# Patient Record
Sex: FEMALE | Race: BLACK OR AFRICAN AMERICAN | NOT HISPANIC OR LATINO | URBAN - METROPOLITAN AREA
[De-identification: names, ages, dates, MRNs, and addresses within clinical notes are randomized per-mention and may not be internally consistent; named-entity substitution may affect disease eponyms.]

---

## 2022-08-31 ENCOUNTER — OFFICE VISIT (OUTPATIENT)
Dept: URBAN - METROPOLITAN AREA CLINIC 44 | Facility: CLINIC | Age: 42
End: 2022-08-31

## 2022-10-26 ENCOUNTER — OFFICE VISIT (OUTPATIENT)
Dept: URBAN - METROPOLITAN AREA CLINIC 44 | Facility: CLINIC | Age: 42
End: 2022-10-26

## 2022-12-29 ENCOUNTER — OFFICE VISIT (OUTPATIENT)
Dept: URBAN - METROPOLITAN AREA CLINIC 46 | Facility: CLINIC | Age: 42
End: 2022-12-29
Payer: COMMERCIAL

## 2022-12-29 VITALS
HEART RATE: 76 BPM | WEIGHT: 253.4 LBS | TEMPERATURE: 98.3 F | DIASTOLIC BLOOD PRESSURE: 85 MMHG | SYSTOLIC BLOOD PRESSURE: 131 MMHG

## 2022-12-29 DIAGNOSIS — R10.13 DYSPEPSIA: ICD-10-CM

## 2022-12-29 PROCEDURE — 99204 OFFICE O/P NEW MOD 45 MIN: CPT | Performed by: INTERNAL MEDICINE

## 2022-12-29 RX ORDER — ERGOCALCIFEROL 1.25 MG/1
TAKE 1 CAPSULE BY MOUTH ONE TIME PER WEEK CAPSULE, LIQUID FILLED ORAL
Qty: 12 EACH | Refills: 1 | Status: ACTIVE | COMMUNITY

## 2022-12-29 RX ORDER — FLUTICASONE PROPIONATE 50 UG/1
SPRAY, METERED NASAL
Qty: 16 GRAM | Status: ACTIVE | COMMUNITY

## 2022-12-29 RX ORDER — OLMESARTAN MEDOXOMIL AND HYDROCHLOROTHIAZIDE 20; 12.5 MG/1; MG/1
TABLET ORAL
Qty: 90 TABLET | Status: ACTIVE | COMMUNITY

## 2022-12-29 NOTE — HPI-TODAY'S VISIT:
42-year-old female is here complaining of reflux associated with belching.  Symptoms have been on and off for the past couple months associated with some chest pain.  The patient took over-the-counter omeprazole and her symptoms resolved.  She does not take it every day but she when she does take it her symptoms are controlled.  She has noticed she has become more inactive and has gained some weight.  The patient is a former track star and she understands importance of eating better, getting back in the gym and exercising.

## 2023-03-02 ENCOUNTER — WEB ENCOUNTER (OUTPATIENT)
Dept: URBAN - METROPOLITAN AREA CLINIC 88 | Facility: CLINIC | Age: 43
End: 2023-03-02

## 2023-03-02 ENCOUNTER — LAB OUTSIDE AN ENCOUNTER (OUTPATIENT)
Dept: URBAN - METROPOLITAN AREA CLINIC 88 | Facility: CLINIC | Age: 43
End: 2023-03-02

## 2023-03-02 ENCOUNTER — OFFICE VISIT (OUTPATIENT)
Dept: URBAN - METROPOLITAN AREA CLINIC 88 | Facility: CLINIC | Age: 43
End: 2023-03-02
Payer: COMMERCIAL

## 2023-03-02 VITALS
WEIGHT: 252.4 LBS | SYSTOLIC BLOOD PRESSURE: 108 MMHG | HEIGHT: 67 IN | TEMPERATURE: 98 F | BODY MASS INDEX: 39.62 KG/M2 | HEART RATE: 92 BPM | DIASTOLIC BLOOD PRESSURE: 80 MMHG

## 2023-03-02 DIAGNOSIS — R76.8 HCV ANTIBODY POSITIVE: ICD-10-CM

## 2023-03-02 PROCEDURE — 99203 OFFICE O/P NEW LOW 30 MIN: CPT | Performed by: NURSE PRACTITIONER

## 2023-03-02 RX ORDER — FLUTICASONE PROPIONATE 50 UG/1
SPRAY, METERED NASAL
Qty: 16 GRAM | Status: ACTIVE | COMMUNITY

## 2023-03-02 RX ORDER — OLMESARTAN MEDOXOMIL AND HYDROCHLOROTHIAZIDE 20; 12.5 MG/1; MG/1
TABLET ORAL
Qty: 90 TABLET | Status: ACTIVE | COMMUNITY

## 2023-03-02 RX ORDER — ERGOCALCIFEROL 1.25 MG/1
TAKE 1 CAPSULE BY MOUTH ONE TIME PER WEEK CAPSULE, LIQUID FILLED ORAL
Qty: 12 EACH | Refills: 1 | Status: ACTIVE | COMMUNITY

## 2023-03-02 NOTE — HPI-TODAY'S VISIT:
Patient presents today to discuss positive HCV that she was informed of on 01/21/2023.  Presented for routine yearly visit with HCV Ab test ordered at this time.  Additional labs revealed Alk Phos 88, AST 26, ALT 50, TB 0.6, TG 83, Hgb 12.3, MCV 93,  and Vitamin D 61.9.  Denies risks factors for acquiring the virus, hx of fatty liver disease, history of elevated LFTs or prior knowledge of HCV Ab.

## 2023-03-09 ENCOUNTER — OFFICE VISIT (OUTPATIENT)
Dept: URBAN - METROPOLITAN AREA CLINIC 48 | Facility: CLINIC | Age: 43
End: 2023-03-09

## 2023-03-21 ENCOUNTER — OFFICE VISIT (OUTPATIENT)
Dept: URBAN - METROPOLITAN AREA CLINIC 48 | Facility: CLINIC | Age: 43
End: 2023-03-21

## 2023-04-03 ENCOUNTER — OFFICE VISIT (OUTPATIENT)
Dept: URBAN - METROPOLITAN AREA CLINIC 88 | Facility: CLINIC | Age: 43
End: 2023-04-03

## 2023-06-16 ENCOUNTER — DASHBOARD ENCOUNTERS (OUTPATIENT)
Age: 43
End: 2023-06-16

## 2023-06-22 ENCOUNTER — OFFICE VISIT (OUTPATIENT)
Dept: URBAN - METROPOLITAN AREA CLINIC 46 | Facility: CLINIC | Age: 43
End: 2023-06-22

## 2023-06-22 RX ORDER — ERGOCALCIFEROL 1.25 MG/1
1 CAPSULE CAPSULE, LIQUID FILLED ORAL ONCE DAILY
Refills: 1 | Status: ACTIVE | COMMUNITY

## 2023-06-22 RX ORDER — FLUTICASONE PROPIONATE 50 UG/1
1 SPRAY IN EACH NOSTRIL SPRAY, METERED NASAL ONCE A DAY
Status: ACTIVE | COMMUNITY

## 2023-06-22 RX ORDER — OLMESARTAN MEDOXOMIL AND HYDROCHLOROTHIAZIDE 20; 12.5 MG/1; MG/1
1 TABLET TABLET ORAL ONCE A DAY
Qty: 90 TABLET | Status: ACTIVE | COMMUNITY